# Patient Record
Sex: FEMALE | URBAN - METROPOLITAN AREA
[De-identification: names, ages, dates, MRNs, and addresses within clinical notes are randomized per-mention and may not be internally consistent; named-entity substitution may affect disease eponyms.]

---

## 2022-01-28 ENCOUNTER — APPOINTMENT (RX ONLY)
Dept: URBAN - METROPOLITAN AREA CLINIC 40 | Facility: CLINIC | Age: 37
Setting detail: DERMATOLOGY
End: 2022-01-28

## 2022-01-28 DIAGNOSIS — L20.89 OTHER ATOPIC DERMATITIS: ICD-10-CM | Status: INADEQUATELY CONTROLLED

## 2022-01-28 DIAGNOSIS — Z41.9 ENCOUNTER FOR PROCEDURE FOR PURPOSES OTHER THAN REMEDYING HEALTH STATE, UNSPECIFIED: ICD-10-CM

## 2022-01-28 DIAGNOSIS — B35.1 TINEA UNGUIUM: ICD-10-CM | Status: INADEQUATELY CONTROLLED

## 2022-01-28 DIAGNOSIS — L60.8 OTHER NAIL DISORDERS: ICD-10-CM | Status: INADEQUATELY CONTROLLED

## 2022-01-28 DIAGNOSIS — L98.8 OTHER SPECIFIED DISORDERS OF THE SKIN AND SUBCUTANEOUS TISSUE: ICD-10-CM | Status: INADEQUATELY CONTROLLED

## 2022-01-28 PROBLEM — L20.84 INTRINSIC (ALLERGIC) ECZEMA: Status: ACTIVE | Noted: 2022-01-28

## 2022-01-28 PROCEDURE — ? COUNSELING

## 2022-01-28 PROCEDURE — ? PRESCRIPTION

## 2022-01-28 PROCEDURE — ? DIAGNOSIS COMMENT

## 2022-01-28 PROCEDURE — 99204 OFFICE O/P NEW MOD 45 MIN: CPT

## 2022-01-28 PROCEDURE — ? ADDITIONAL NOTES

## 2022-01-28 PROCEDURE — ? RECOMMENDATIONS

## 2022-01-28 RX ORDER — KETOCONAZOLE 20 MG/G
CREAM TOPICAL
Qty: 60 | Refills: 1 | Status: ERX | COMMUNITY
Start: 2022-01-28

## 2022-01-28 RX ADMIN — KETOCONAZOLE: 20 CREAM TOPICAL at 00:00

## 2022-01-28 ASSESSMENT — LOCATION DETAILED DESCRIPTION DERM
LOCATION DETAILED: LEFT DISTAL PLANTAR GREAT TOE
LOCATION DETAILED: RIGHT RADIAL DORSAL HAND
LOCATION DETAILED: RIGHT DISTAL PLANTAR GREAT TOE
LOCATION DETAILED: RIGHT THUMBNAIL
LOCATION DETAILED: RIGHT INDEX FINGERNAIL
LOCATION DETAILED: LEFT DORSAL MIDDLE FINGER METACARPOPHALANGEAL JOINT

## 2022-01-28 ASSESSMENT — LOCATION SIMPLE DESCRIPTION DERM
LOCATION SIMPLE: RIGHT HAND
LOCATION SIMPLE: LEFT GREAT TOE
LOCATION SIMPLE: RIGHT GREAT TOE
LOCATION SIMPLE: RIGHT THUMBNAIL
LOCATION SIMPLE: LEFT HAND
LOCATION SIMPLE: RIGHT INDEX FINGERNAIL

## 2022-01-28 ASSESSMENT — LOCATION ZONE DERM
LOCATION ZONE: TOE
LOCATION ZONE: FINGERNAIL
LOCATION ZONE: HAND

## 2022-01-28 NOTE — PROCEDURE: RECOMMENDATIONS
Recommendation Preamble: The following recommendations were made during the visit:
Detail Level: Zone
Recommendations (Free Text): - camryn Jones for hair removal consult
Render Risk Assessment In Note?: no

## 2022-01-28 NOTE — HPI: RASH
What Type Of Note Output Would You Prefer (Optional)?: Bullet Format
How Severe Is Your Rash?: mild
Is This A New Presentation, Or A Follow-Up?: Rash
Additional History: Pt states she used Triamcinolone which has helped. Pt also states she would like her toes looked at today. Pt states she has excessive body hair.

## 2022-01-28 NOTE — PROCEDURE: DIAGNOSIS COMMENT
Render Risk Assessment In Note?: no
Comment: Sparse nail pits - possible early evolving psoriasis.  Some areas possible co-infected w/ tinea; will tx w/ ketoconazole cream bid x 2w then follow-up.  Pt declined exam of other areas today as \"rash not there today\".  May need oral tx for onychomycosis of toenails.  F/u 2w.
Detail Level: Simple

## 2022-02-14 ENCOUNTER — APPOINTMENT (RX ONLY)
Dept: URBAN - METROPOLITAN AREA CLINIC 40 | Facility: CLINIC | Age: 37
Setting detail: DERMATOLOGY
End: 2022-02-14

## 2022-02-14 DIAGNOSIS — Z41.9 ENCOUNTER FOR PROCEDURE FOR PURPOSES OTHER THAN REMEDYING HEALTH STATE, UNSPECIFIED: ICD-10-CM | Status: STABLE

## 2022-02-14 PROCEDURE — ? ADDITIONAL NOTES

## 2022-02-14 PROCEDURE — ? PRESCRIPTION

## 2022-02-14 PROCEDURE — ? COUNSELING

## 2022-02-14 PROCEDURE — 99213 OFFICE O/P EST LOW 20 MIN: CPT

## 2022-02-14 RX ORDER — EFLORNITHINE HYDROCHLORIDE 139 MG/G
CREAM TOPICAL BID
Qty: 45 | Refills: 3 | Status: ERX | COMMUNITY
Start: 2022-02-14

## 2022-02-14 RX ADMIN — EFLORNITHINE HYDROCHLORIDE: 139 CREAM TOPICAL at 00:00

## 2022-02-14 NOTE — PROCEDURE: COUNSELING
Detail Level: Detailed
Patient Specific Counseling (Will Not Stick From Patient To Patient): LB counsels on seeing OBGYN, counsels on monitoring hormone levels and that hair may be genetic treatment, LB counsels on tx: [facility] 6-8 sessions, Hair removal cream, threading, shaving.

## 2022-02-14 NOTE — HPI: UNWANTED HAIR
Additional History: Excessive hair all over, specifically on the face, tried [facility], waxing, threading, cream to remove and bleach.
